# Patient Record
Sex: FEMALE | Race: WHITE | URBAN - NONMETROPOLITAN AREA
[De-identification: names, ages, dates, MRNs, and addresses within clinical notes are randomized per-mention and may not be internally consistent; named-entity substitution may affect disease eponyms.]

---

## 2024-02-23 ENCOUNTER — OV NP (OUTPATIENT)
Dept: URBAN - NONMETROPOLITAN AREA CLINIC 2 | Facility: CLINIC | Age: 24
End: 2024-02-23
Payer: COMMERCIAL

## 2024-02-23 VITALS
WEIGHT: 123.4 LBS | SYSTOLIC BLOOD PRESSURE: 105 MMHG | BODY MASS INDEX: 21.86 KG/M2 | HEIGHT: 63 IN | DIASTOLIC BLOOD PRESSURE: 69 MMHG | HEART RATE: 116 BPM

## 2024-02-23 DIAGNOSIS — K58.0 IRRITABLE BOWEL SYNDROME WITH DIARRHEA: ICD-10-CM

## 2024-02-23 DIAGNOSIS — R10.9 ABDOMINAL CRAMPING: ICD-10-CM

## 2024-02-23 PROBLEM — 197125005: Status: ACTIVE | Noted: 2024-02-23

## 2024-02-23 PROCEDURE — 99204 OFFICE O/P NEW MOD 45 MIN: CPT | Performed by: INTERNAL MEDICINE

## 2024-02-23 RX ORDER — ATOGEPANT 60 MG/1
TABLET ORAL
Qty: 25 TABLET | Status: ACTIVE | COMMUNITY

## 2024-02-23 RX ORDER — HYOSCYAMINE SULFATE 0.12 MG/1
1 TABLET UNDER THE TONGUE AND ALLOW TO DISSOLVE AS NEEDED TABLET, ORALLY DISINTEGRATING ORAL THREE TIMES A DAY
Qty: 42 TABLET | Refills: 6 | OUTPATIENT
Start: 2024-02-23 | End: 2024-05-31

## 2024-02-23 RX ORDER — RIFAXIMIN 550 MG/1
1 TABLET TABLET ORAL THREE TIMES A DAY
Qty: 42 TABLET | Refills: 2 | OUTPATIENT
Start: 2024-02-23 | End: 2024-04-05

## 2024-02-23 RX ORDER — DICYCLOMINE HYDROCHLORIDE 10 MG/1
CAPSULE ORAL
Qty: 10 CAPSULE | Status: ACTIVE | COMMUNITY

## 2024-02-23 NOTE — HPI-TODAY'S VISIT:
23-year-old female referred to GI clinic by [facility] for 3 weeks of diarrhea.  The patient reports that she took antibiotics for a sinus infection and developed severe diarrhea with abdominal cramping.  This has not improved over the past few weeks.  She has been taking dicyclomine 20 mg twice daily which does help with her abdominal cramping but causes nervousness and some dry mouth.  She had blood work done which showed normal CBC and CMP.  She had stool testing done which showed a negative stool culture but positive fecal calprotectin at 2100.  She also had positive mucus and white blood cells in her stool.  She has lost about 6 pounds.  She is able to eat and she is not vomiting.  Her diarrhea is slowly improving but she is still having frequent stools and having to take dicyclomine.  Prior to onset of the symptoms, she did not have any current GI symptoms.  She denies any rectal bleeding.

## 2024-03-14 ENCOUNTER — OV EP (OUTPATIENT)
Dept: URBAN - NONMETROPOLITAN AREA CLINIC 13 | Facility: CLINIC | Age: 24
End: 2024-03-14
Payer: COMMERCIAL

## 2024-03-14 VITALS
HEIGHT: 63 IN | SYSTOLIC BLOOD PRESSURE: 116 MMHG | BODY MASS INDEX: 22.32 KG/M2 | WEIGHT: 126 LBS | HEART RATE: 76 BPM | DIASTOLIC BLOOD PRESSURE: 76 MMHG

## 2024-03-14 DIAGNOSIS — A04.72 C. DIFFICILE DIARRHEA: ICD-10-CM

## 2024-03-14 DIAGNOSIS — K58.0 IRRITABLE BOWEL SYNDROME WITH DIARRHEA: ICD-10-CM

## 2024-03-14 DIAGNOSIS — R10.9 ABDOMINAL CRAMPING: ICD-10-CM

## 2024-03-14 PROCEDURE — 99214 OFFICE O/P EST MOD 30 MIN: CPT | Performed by: NURSE PRACTITIONER

## 2024-03-14 RX ORDER — DICYCLOMINE HYDROCHLORIDE 10 MG/1
CAPSULE ORAL
Qty: 10 CAPSULE | Status: ON HOLD | COMMUNITY

## 2024-03-14 RX ORDER — ATOGEPANT 60 MG/1
TABLET ORAL
Qty: 25 TABLET | Status: ACTIVE | COMMUNITY

## 2024-03-14 RX ORDER — HYOSCYAMINE SULFATE 0.12 MG/1
1 TABLET UNDER THE TONGUE AND ALLOW TO DISSOLVE AS NEEDED TABLET, ORALLY DISINTEGRATING ORAL THREE TIMES A DAY
Qty: 42 TABLET | Refills: 6 | OUTPATIENT

## 2024-03-14 RX ORDER — RIFAXIMIN 550 MG/1
1 TABLET TABLET ORAL THREE TIMES A DAY
Qty: 42 TABLET | Refills: 2 | Status: ACTIVE | COMMUNITY
Start: 2024-02-23 | End: 2024-04-05

## 2024-03-14 RX ORDER — HYOSCYAMINE SULFATE 0.12 MG/1
1 TABLET UNDER THE TONGUE AND ALLOW TO DISSOLVE AS NEEDED TABLET, ORALLY DISINTEGRATING ORAL THREE TIMES A DAY
Qty: 42 TABLET | Refills: 6 | Status: ACTIVE | COMMUNITY
Start: 2024-02-23 | End: 2024-05-31

## 2024-03-14 NOTE — HPI-TODAY'S VISIT:
Ms. Roya Woodruff is a 23-year-old female who presents today for follow-up of diarrhea.  Since her last visit stool studies returned back as positive for C. difficile.  She completed a course of vancomycin on Sunday.  Today she reports that she is still experiencing some loose stools but diarrhea has ceased.  She is also noticing some bloating and intermittent cramping.  She continues to take Florastor twice a day and Levsin.  No blood in her stool.  LG.

## 2024-03-14 NOTE — HPI-OTHER HISTORIES
2/23/2024: 23-year-old female referred to GI clinic by [facility] for 3 weeks of diarrhea. The patient reports that she took antibiotics for a sinus infection and developed severe diarrhea with abdominal cramping. This has not improved over the past few weeks. She has been taking dicyclomine 20 mg twice daily which does help with her abdominal cramping but causes nervousness and some dry mouth. She had blood work done which showed normal CBC and CMP. She had stool testing done which showed a negative stool culture but positive fecal calprotectin at 2100. She also had positive mucus and white blood cells in her stool. She has lost about 6 pounds. She is able to eat and she is not vomiting. Her diarrhea is slowly improving but she is still having frequent stools and having to take dicyclomine. Prior to onset of the symptoms, she did not have any current GI symptoms. She denies any rectal bleeding.

## 2024-03-14 NOTE — PHYSICAL EXAM CONSTITUTIONAL:
well developed, well nourished , in no acute distress , ambulating without difficulty , normal communication ability  yes

## 2024-04-04 ENCOUNTER — LAB (OUTPATIENT)
Dept: URBAN - METROPOLITAN AREA TELEHEALTH 2 | Facility: TELEHEALTH | Age: 24
End: 2024-04-04

## 2024-04-04 ENCOUNTER — TELEP (OUTPATIENT)
Dept: URBAN - METROPOLITAN AREA TELEHEALTH 2 | Facility: TELEHEALTH | Age: 24
End: 2024-04-04
Payer: COMMERCIAL

## 2024-04-04 VITALS — HEIGHT: 63 IN

## 2024-04-04 DIAGNOSIS — R10.84 ABDOMINAL CRAMPING, GENERALIZED: ICD-10-CM

## 2024-04-04 DIAGNOSIS — K58.0 IRRITABLE BOWEL SYNDROME WITH DIARRHEA: ICD-10-CM

## 2024-04-04 PROCEDURE — 99214 OFFICE O/P EST MOD 30 MIN: CPT | Performed by: NURSE PRACTITIONER

## 2024-04-04 RX ORDER — ATOGEPANT 60 MG/1
TABLET ORAL
Qty: 25 TABLET | Status: ACTIVE | COMMUNITY

## 2024-04-04 RX ORDER — HYOSCYAMINE SULFATE 0.12 MG/1
1 TABLET UNDER THE TONGUE AND ALLOW TO DISSOLVE AS NEEDED TABLET, ORALLY DISINTEGRATING ORAL THREE TIMES A DAY
Qty: 42 TABLET | Refills: 6 | Status: ACTIVE | COMMUNITY

## 2024-04-04 RX ORDER — RIFAXIMIN 550 MG/1
1 TABLET TABLET ORAL THREE TIMES A DAY
Qty: 42 TABLET | Refills: 2 | Status: ACTIVE | COMMUNITY
Start: 2024-02-23 | End: 2024-04-05

## 2024-04-04 RX ORDER — HYOSCYAMINE SULFATE 0.12 MG/1
1 TABLET UNDER THE TONGUE AND ALLOW TO DISSOLVE AS NEEDED TABLET, ORALLY DISINTEGRATING ORAL THREE TIMES A DAY
Qty: 42 TABLET | Refills: 6 | OUTPATIENT

## 2024-04-04 RX ORDER — DICYCLOMINE HYDROCHLORIDE 10 MG/1
CAPSULE ORAL
Qty: 10 CAPSULE | Status: ON HOLD | COMMUNITY

## 2024-04-04 NOTE — HPI-TODAY'S VISIT:
Roya presents via telehealth for follow-up of recurrent C. difficile.  since her last visit diarrhea returned and subsequent stool sample showed persistent C. difficile toxin and elevated fecal calprotectin she was prescribed Dificid which she completed.  On Sunday.  Today she reports passing 1-2 stools per day without abdominal pain, diarrhea, or hematochezia.  She has had some cramping after eating breakfast this morning but remains on Florastor and uses Levsin as needed.  LG.

## 2024-04-04 NOTE — HPI-OTHER HISTORIES
2/23/2024: 23-year-old female referred to GI clinic by [facility] for 3 weeks of diarrhea. The patient reports that she took antibiotics for a sinus infection and developed severe diarrhea with abdominal cramping. This has not improved over the past few weeks. She has been taking dicyclomine 20 mg twice daily which does help with her abdominal cramping but causes nervousness and some dry mouth. She had blood work done which showed normal CBC and CMP. She had stool testing done which showed a negative stool culture but positive fecal calprotectin at 2100. She also had positive mucus and white blood cells in her stool. She has lost about 6 pounds. She is able to eat and she is not vomiting. Her diarrhea is slowly improving but she is still having frequent stools and having to take dicyclomine. Prior to onset of the symptoms, she did not have any current GI symptoms. She denies any rectal bleeding.  3/13/2024: Ms. Roya Woodruff is a 23-year-old female who presents today for follow-up of diarrhea. Since her last visit stool studies returned back as positive for C. difficile. She completed a course of vancomycin on Sunday. Today she reports that she is still experiencing some loose stools but diarrhea has ceased. She is also noticing some bloating and intermittent cramping. She continues to take Florastor twice a day and Levsin. No blood in her stool. LG.

## 2024-04-23 ENCOUNTER — TELNP (OUTPATIENT)
Dept: URBAN - METROPOLITAN AREA TELEHEALTH 2 | Facility: TELEHEALTH | Age: 24
End: 2024-04-23

## 2024-04-25 ENCOUNTER — LAB (OUTPATIENT)
Dept: URBAN - METROPOLITAN AREA TELEHEALTH 2 | Facility: TELEHEALTH | Age: 24
End: 2024-04-25

## 2024-05-01 ENCOUNTER — OFFICE VISIT (OUTPATIENT)
Dept: URBAN - METROPOLITAN AREA TELEHEALTH 2 | Facility: TELEHEALTH | Age: 24
End: 2024-05-01
Payer: COMMERCIAL

## 2024-05-01 VITALS — WEIGHT: 127 LBS | HEIGHT: 64 IN | BODY MASS INDEX: 21.68 KG/M2

## 2024-05-01 DIAGNOSIS — K58.9 IRRITABLE BOWEL SYNDROME WITHOUT DIARRHEA: ICD-10-CM

## 2024-05-01 PROCEDURE — 97802 MEDICAL NUTRITION INDIV IN: CPT | Performed by: DIETITIAN, REGISTERED

## 2024-05-01 RX ORDER — HYOSCYAMINE SULFATE 0.12 MG/1
1 TABLET UNDER THE TONGUE AND ALLOW TO DISSOLVE AS NEEDED TABLET, ORALLY DISINTEGRATING ORAL THREE TIMES A DAY
Qty: 42 TABLET | Refills: 6 | Status: ACTIVE | COMMUNITY

## 2024-05-01 RX ORDER — ATOGEPANT 60 MG/1
TABLET ORAL
Qty: 25 TABLET | Status: ACTIVE | COMMUNITY

## 2024-05-01 RX ORDER — DICYCLOMINE HYDROCHLORIDE 10 MG/1
CAPSULE ORAL
Qty: 10 CAPSULE | Status: ON HOLD | COMMUNITY

## 2024-05-02 ENCOUNTER — TELEPHONE ENCOUNTER (OUTPATIENT)
Dept: URBAN - NONMETROPOLITAN AREA CLINIC 2 | Facility: CLINIC | Age: 24
End: 2024-05-02

## 2024-05-08 ENCOUNTER — WEB ENCOUNTER (OUTPATIENT)
Dept: URBAN - NONMETROPOLITAN AREA CLINIC 13 | Facility: CLINIC | Age: 24
End: 2024-05-08

## 2024-05-13 ENCOUNTER — TELEPHONE ENCOUNTER (OUTPATIENT)
Dept: URBAN - NONMETROPOLITAN AREA CLINIC 13 | Facility: CLINIC | Age: 24
End: 2024-05-13

## 2024-05-17 ENCOUNTER — TELEPHONE ENCOUNTER (OUTPATIENT)
Dept: URBAN - NONMETROPOLITAN AREA CLINIC 13 | Facility: CLINIC | Age: 24
End: 2024-05-17

## 2024-05-21 ENCOUNTER — OFFICE VISIT (OUTPATIENT)
Dept: URBAN - NONMETROPOLITAN AREA SURGERY CENTER 1 | Facility: SURGERY CENTER | Age: 24
End: 2024-05-21

## 2024-06-17 ENCOUNTER — OFFICE VISIT (OUTPATIENT)
Dept: URBAN - METROPOLITAN AREA TELEHEALTH 2 | Facility: TELEHEALTH | Age: 24
End: 2024-06-17
Payer: COMMERCIAL

## 2024-06-17 DIAGNOSIS — K58.0 IBS-D: ICD-10-CM

## 2024-06-17 PROCEDURE — 97803 MED NUTRITION INDIV SUBSEQ: CPT | Performed by: DIETITIAN, REGISTERED

## 2024-06-17 RX ORDER — HYOSCYAMINE SULFATE 0.12 MG/1
1 TABLET UNDER THE TONGUE AND ALLOW TO DISSOLVE AS NEEDED TABLET, ORALLY DISINTEGRATING ORAL THREE TIMES A DAY
Qty: 42 TABLET | Refills: 6 | Status: ACTIVE | COMMUNITY

## 2024-06-17 RX ORDER — DICYCLOMINE HYDROCHLORIDE 10 MG/1
CAPSULE ORAL
Qty: 10 CAPSULE | Status: ON HOLD | COMMUNITY

## 2024-06-17 RX ORDER — ATOGEPANT 60 MG/1
TABLET ORAL
Qty: 25 TABLET | Status: ACTIVE | COMMUNITY

## 2024-07-19 ENCOUNTER — TELEPHONE ENCOUNTER (OUTPATIENT)
Dept: URBAN - NONMETROPOLITAN AREA CLINIC 13 | Facility: CLINIC | Age: 24
End: 2024-07-19

## 2024-07-19 ENCOUNTER — LAB OUTSIDE AN ENCOUNTER (OUTPATIENT)
Dept: URBAN - NONMETROPOLITAN AREA CLINIC 13 | Facility: CLINIC | Age: 24
End: 2024-07-19

## 2024-08-16 ENCOUNTER — TELEPHONE ENCOUNTER (OUTPATIENT)
Dept: URBAN - NONMETROPOLITAN AREA CLINIC 2 | Facility: CLINIC | Age: 24
End: 2024-08-16

## 2024-08-20 ENCOUNTER — OFFICE VISIT (OUTPATIENT)
Dept: URBAN - NONMETROPOLITAN AREA SURGERY CENTER 1 | Facility: SURGERY CENTER | Age: 24
End: 2024-08-20